# Patient Record
Sex: MALE | URBAN - METROPOLITAN AREA
[De-identification: names, ages, dates, MRNs, and addresses within clinical notes are randomized per-mention and may not be internally consistent; named-entity substitution may affect disease eponyms.]

---

## 2020-01-22 ENCOUNTER — APPOINTMENT (RX ONLY)
Dept: URBAN - METROPOLITAN AREA CLINIC 350 | Facility: CLINIC | Age: 70
Setting detail: DERMATOLOGY
End: 2020-01-22

## 2020-01-22 DIAGNOSIS — L57.0 ACTINIC KERATOSIS: ICD-10-CM

## 2020-01-22 PROCEDURE — 17000 DESTRUCT PREMALG LESION: CPT

## 2020-01-22 PROCEDURE — ? LIQUID NITROGEN

## 2020-01-22 PROCEDURE — ? COUNSELING

## 2020-01-22 ASSESSMENT — LOCATION SIMPLE DESCRIPTION DERM: LOCATION SIMPLE: POSTERIOR NECK

## 2020-01-22 ASSESSMENT — LOCATION ZONE DERM: LOCATION ZONE: NECK

## 2020-01-22 ASSESSMENT — LOCATION DETAILED DESCRIPTION DERM: LOCATION DETAILED: LEFT LATERAL NECK

## 2020-01-22 NOTE — PROCEDURE: COUNSELING
Detail Level: Detailed
Patient Specific Counseling (Will Not Stick From Patient To Patient): If fails to resolve, then a bx should be done.